# Patient Record
Sex: MALE | Race: BLACK OR AFRICAN AMERICAN | URBAN - NONMETROPOLITAN AREA
[De-identification: names, ages, dates, MRNs, and addresses within clinical notes are randomized per-mention and may not be internally consistent; named-entity substitution may affect disease eponyms.]

---

## 2023-09-13 ENCOUNTER — HOSPITAL ENCOUNTER (OUTPATIENT)
Age: 43
Setting detail: RECURRING SERIES
Discharge: HOME | End: 2023-09-16
Payer: OTHER GOVERNMENT

## 2023-09-13 PROCEDURE — 97110 THERAPEUTIC EXERCISES: CPT

## 2023-09-13 PROCEDURE — 97162 PT EVAL MOD COMPLEX 30 MIN: CPT

## 2023-09-13 NOTE — THERAPY EVALUATION
PT DAILY TREATMENT NOTE/GENERAL EVAL 10-18    Patient Name: Gomez Kern.  [id number]  : 1980  [x]  Patient  Verified  Payor: /    In [id number]  Out [id number]  Total Treatment Time (min): 48  Visit #: 1      Treatment Area: Pain in leg, unspecified [M79.606]  Pain in unspecified foot [M79.673]    SUBJECTIVE  Pt presents with complaint of plantar fasciitis, left > right. Pt runs a [employer], attributes onset of pain to concrete floors in . Pt has tried switching footwear. Pt has also tried OTC orthotics and ones provided by [place]. Ankle braces, overnight boot and topical creams have also been tried. Pt has also been seen by podiatry. Ice massage relieves pain while performing. Pt is also a , states that pain presents after activity.      Pain Level (0-10 scale): Current: 0/10   Worst: 8/10 after activity   []Sharp  []Dull  []Achy []Burning []Throbbing []N&T []Other:  []constant [x]intermittent []improving [x]worsening []no change since onset      Any medication changes, allergies to medications, adverse drug reactions, diagnosis change, or new procedure performed?: [x] No    [] Yes (see summary sheet for update)      OBJECTIVE/EXAMINATION  Palpation: tenderness most noted along medial longitudinal arch, left > right, intact to light touch     Posture: decreased arch on left compared to right       ROM:                                                                    AROM      PROM   Ankle Left Right Left Right   PF 45 46     DF  IV  EV 15  35  30 14  40  30        Great toe extension (measured in standing) : Left: 20 degrees  Right : 40 degrees    Strength (MMT):                       Ankle L (1-5) R (1-5)   Ankle DF 5/5 5/5   Ankle PF 5/5 5/5   Ankle Inversion 5/5 5/5   Ankle Eversion 5/5 5/5    Great toe strength: Left : extension 4/5, flexion 5/5  Right 5/5    Balance/ Equilibrium:         Sitting Balance: Static:  [x] Good    [] Fair    [] Poor     Dynamic:   [x]

## 2023-09-13 NOTE — THERAPY EVALUATION
[address] [place] [address], [facility]  PLAN OF CARE / STATEMENT OF MEDICAL NECESSITY FOR PHYSICAL THERAPY SERVICES  Patient Name: Becki Serra Sr. : 1980   Medical   Diagnosis: Pain in leg, unspecified [M79.606]  Pain in unspecified foot [M79.673] Treatment Diagnosis: Left foot pain   Onset Date:      Referral Source: Cecy Tong MD Start of Care [facility] 2023   Prior Hospitalization: See medical history Provider #: [id number]   Prior Level of Function: Independent, pain-free   Comorbidities: DM, HTN, Kidney disease   Medications: Verified on Patient Summary List   The Plan of Care and following information is based on the information from the initial evaluation.   ==========================================================================================  Assessment / Functional Analysis:    Pt is a 37y.o. year old  male who presents to outpatient clinic today with chief complaint of plantar fasciitis, left > right , related to work in 2017. Pt presents with impairments that include left great toe extension weakness, decreased left great toe extension AROM, decreased unilateral standing balance, pain limiting weight bearing. Lack of arch on left observed in standing. Palpatory tenderness noted most along left medial longitudinal arch. Pt provided with HEP to improve great toe and posterior chain flexibility, foot intrinsics and pain.  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   ==========================================================================================  Eval Complexity: History: MEDIUM  Complexity : 1-2 comorbidities / personal factors will impact the outcome/ POC Exam:MEDIUM Complexity : 3 Standardized tests and measures addressin body structure, function, activity

## 2023-09-18 ENCOUNTER — HOSPITAL ENCOUNTER (OUTPATIENT)
Age: 43
Setting detail: RECURRING SERIES
End: 2023-09-18
Payer: OTHER GOVERNMENT

## 2023-09-20 ENCOUNTER — HOSPITAL ENCOUNTER (OUTPATIENT)
Age: 43
Setting detail: RECURRING SERIES
Discharge: HOME | End: 2023-09-23
Payer: OTHER GOVERNMENT

## 2023-09-20 PROCEDURE — 97110 THERAPEUTIC EXERCISES: CPT

## 2023-09-20 PROCEDURE — 97140 MANUAL THERAPY 1/> REGIONS: CPT

## 2023-09-25 ENCOUNTER — HOSPITAL ENCOUNTER (OUTPATIENT)
Age: 43
Setting detail: RECURRING SERIES
Discharge: HOME | End: 2023-09-28
Payer: OTHER GOVERNMENT

## 2023-09-25 PROCEDURE — 97110 THERAPEUTIC EXERCISES: CPT

## 2023-09-27 ENCOUNTER — HOSPITAL ENCOUNTER (OUTPATIENT)
Age: 43
Setting detail: RECURRING SERIES
Discharge: HOME | End: 2023-09-30
Payer: OTHER GOVERNMENT

## 2023-09-27 PROCEDURE — 97110 THERAPEUTIC EXERCISES: CPT

## 2023-09-27 PROCEDURE — 97140 MANUAL THERAPY 1/> REGIONS: CPT

## 2023-10-02 ENCOUNTER — HOSPITAL ENCOUNTER (OUTPATIENT)
Age: 43
Setting detail: RECURRING SERIES
Discharge: HOME | End: 2023-10-05
Payer: OTHER GOVERNMENT

## 2023-10-02 PROCEDURE — 97140 MANUAL THERAPY 1/> REGIONS: CPT

## 2023-10-02 PROCEDURE — 97110 THERAPEUTIC EXERCISES: CPT

## 2023-10-09 ENCOUNTER — HOSPITAL ENCOUNTER (OUTPATIENT)
Age: 43
Setting detail: RECURRING SERIES
Discharge: HOME | End: 2023-10-12
Payer: OTHER GOVERNMENT

## 2023-10-09 PROCEDURE — 97140 MANUAL THERAPY 1/> REGIONS: CPT

## 2023-10-09 PROCEDURE — 97110 THERAPEUTIC EXERCISES: CPT

## 2023-10-11 ENCOUNTER — HOSPITAL ENCOUNTER (OUTPATIENT)
Age: 43
Setting detail: RECURRING SERIES
Discharge: HOME | End: 2023-10-14
Payer: OTHER GOVERNMENT

## 2023-10-11 PROCEDURE — 97110 THERAPEUTIC EXERCISES: CPT

## 2023-10-11 PROCEDURE — 97140 MANUAL THERAPY 1/> REGIONS: CPT

## 2023-10-16 ENCOUNTER — HOSPITAL ENCOUNTER (OUTPATIENT)
Age: 43
Setting detail: RECURRING SERIES
Discharge: HOME | End: 2023-10-19
Payer: OTHER GOVERNMENT

## 2023-10-16 PROCEDURE — 97140 MANUAL THERAPY 1/> REGIONS: CPT

## 2023-10-16 PROCEDURE — 97110 THERAPEUTIC EXERCISES: CPT

## 2023-10-16 NOTE — PROGRESS NOTES
continued with marble PU to increase foot agility. Patient then receives soft tissue massage to decrease TP & increase ROM for safe performance of ADL; no adverse reaction noted.  post session to decrease pain and soreness. Pt to cont with tennis ball rolling at home. Patient will continue to benefit from skilled PT services to modify and progress therapeutic interventions, analyze and address functional mobility deficits, analyze and address ROM deficits, analyze and address strength deficits, and analyze and address soft tissue restrictions to attain remaining goals.        [x]  See Plan of Care  []  See progress note/recertification  []  See Discharge Summary           PLAN  []  Upgrade activities as tolerated     [x]  Continue plan of care  []  Update interventions per flow sheet       []  Discharge due to:_  []  Other:_      Nelly Sharma PTA, LPTA 10/16/2023  12:29 PM

## 2023-10-25 ENCOUNTER — HOSPITAL ENCOUNTER (OUTPATIENT)
Age: 43
Setting detail: RECURRING SERIES
Discharge: HOME | End: 2023-10-28
Payer: OTHER GOVERNMENT

## 2023-10-25 PROCEDURE — 97110 THERAPEUTIC EXERCISES: CPT

## 2023-10-25 PROCEDURE — 97112 NEUROMUSCULAR REEDUCATION: CPT

## 2023-10-25 NOTE — THERAPY RECERTIFICATION
musculature and improve ability to complete ADLs/work duties/hobbies without increases in pain. Problem List: pain affecting function, decrease ROM, decrease strength, decrease ADL/ functional abilitiies, decrease activity tolerance, and decrease flexibility/ joint mobility       Updated Plan of Care:    Treatment Plan to include the following per provider discretion: Theraputic Exercise, Moist Heat, Cryotherapy, Ultrasound, Manual Therapy, Gait and Balance Training, Teaching of a HEP, and Vasopneumatic Compression    Frequency / Duration:  Patient to be seen   2   times per week for   3  weeks        If you have any questions/comments please contact us directly at [phone]. Thank you for allowing us to assist in the care of your patient. Therapist Signature: Paul Arreola PT, DPT Date: 60/95/1729   Certification Period:  Reporting Period: 10/25/2023-01/23/2024 09/13/2023-10/25/2023 Time: 10:34 AM   NOTE TO PHYSICIAN:  PLEASE COMPLETE THE ORDERS BELOW AND FAX TO   [facility]: [phone]. If you are unable to process this request in 24 hours please contact our office: [phone].    ___ I have read the above report and request that my patient continue as recommended.   ___ I have read the above report and request that my patient continue therapy with the following changes/special instructions: ________________________________________________   ___ I have read the above report and request that my patient be discharged from therapy.      Physician Signature:        Date:       Time:

## 2023-10-30 ENCOUNTER — APPOINTMENT (OUTPATIENT)
Age: 43
End: 2023-10-30
Payer: OTHER GOVERNMENT

## 2023-11-06 ENCOUNTER — HOSPITAL ENCOUNTER (OUTPATIENT)
Age: 43
Setting detail: RECURRING SERIES
Discharge: HOME | End: 2023-11-09
Payer: OTHER GOVERNMENT

## 2023-11-06 PROCEDURE — 97110 THERAPEUTIC EXERCISES: CPT

## 2023-11-06 PROCEDURE — 97140 MANUAL THERAPY 1/> REGIONS: CPT

## 2023-11-15 ENCOUNTER — HOSPITAL ENCOUNTER (OUTPATIENT)
Age: 43
Setting detail: RECURRING SERIES
Discharge: HOME | End: 2023-11-18
Payer: OTHER GOVERNMENT

## 2023-11-15 PROCEDURE — 97110 THERAPEUTIC EXERCISES: CPT

## 2023-11-15 PROCEDURE — 97140 MANUAL THERAPY 1/> REGIONS: CPT

## 2023-11-15 NOTE — PROGRESS NOTES
kasie. No pain in R foot but slight pain in L foot. Followed by performing stretches via great toe stretch and gastroc/soloeus stretches. Strength using graded band (red and issued GTB f or HEP with instruction to advance to green when ready) for ankle 4 way and 2.5 weight resistance for Inversion and Eversion B LE's. Patient then receives soft tissue massage to decrease TP & increase ROM for safe performance of ADL. R foot noted decreased adhesions from previous session, along the arch of the foot which PTA continued STM in effort to remove adhesions. Pt to cont with tennis ball rolling and or frozen water bottle at home. Included SLS on airex and heel raises on 6 inch step with no adverse reaction. Noted good stability with SLS challenge. Pt has requested to stop therapy for plantar fascitis and began therapy for L knee pain. Current order has  with instruction for pt to obtain new order for knee and as soon as it is received in this office, we will call to set up evaluation for this order. This was discussed with evaluating PT. Patient will continue to benefit from skilled PT services to modify and progress therapeutic interventions, analyze and address functional mobility deficits, analyze and address ROM deficits, analyze and address strength deficits, and analyze and address soft tissue restrictions to attain remaining goals.        []  See Plan of Care  [x]  See progress note/recertification  []  See Discharge Summary           PLAN  []  Upgrade activities as tolerated     [x]  Continue plan of care  []  Update interventions per flow sheet       []  Discharge due to:_  []  Other:_      Elijah Adamson PTA , LPTA 11/15/2023  10:09 AM